# Patient Record
Sex: FEMALE | Race: WHITE | URBAN - METROPOLITAN AREA
[De-identification: names, ages, dates, MRNs, and addresses within clinical notes are randomized per-mention and may not be internally consistent; named-entity substitution may affect disease eponyms.]

---

## 2018-11-29 ENCOUNTER — APPOINTMENT (RX ONLY)
Dept: URBAN - METROPOLITAN AREA CLINIC 16 | Facility: CLINIC | Age: 73
Setting detail: DERMATOLOGY
End: 2018-11-29

## 2018-11-29 DIAGNOSIS — B00.1 HERPESVIRAL VESICULAR DERMATITIS: ICD-10-CM

## 2018-11-29 DIAGNOSIS — Z41.9 ENCOUNTER FOR PROCEDURE FOR PURPOSES OTHER THAN REMEDYING HEALTH STATE, UNSPECIFIED: ICD-10-CM

## 2018-11-29 PROBLEM — H91.90 UNSPECIFIED HEARING LOSS, UNSPECIFIED EAR: Status: ACTIVE | Noted: 2018-11-29

## 2018-11-29 PROBLEM — Z85.828 PERSONAL HISTORY OF OTHER MALIGNANT NEOPLASM OF SKIN: Status: ACTIVE | Noted: 2018-11-29

## 2018-11-29 PROBLEM — M12.9 ARTHROPATHY, UNSPECIFIED: Status: ACTIVE | Noted: 2018-11-29

## 2018-11-29 PROCEDURE — ? PRESCRIPTION

## 2018-11-29 PROCEDURE — ? COUNSELING

## 2018-11-29 PROCEDURE — ? DIAGNOSIS COMMENT

## 2018-11-29 PROCEDURE — ? FILLERS

## 2018-11-29 RX ORDER — VALACYCLOVIR HYDROCHLORIDE 1 G/1
TABLET, FILM COATED ORAL Q12 HOURS
Qty: 16 | Refills: 0 | Status: ERX | COMMUNITY
Start: 2018-11-29

## 2018-11-29 RX ADMIN — VALACYCLOVIR HYDROCHLORIDE: 1 TABLET, FILM COATED ORAL at 19:54

## 2018-11-29 ASSESSMENT — LOCATION DETAILED DESCRIPTION DERM: LOCATION DETAILED: NASAL SUPRATIP

## 2018-11-29 ASSESSMENT — LOCATION SIMPLE DESCRIPTION DERM: LOCATION SIMPLE: NOSE

## 2018-11-29 ASSESSMENT — LOCATION ZONE DERM: LOCATION ZONE: NOSE

## 2018-11-29 NOTE — PROCEDURE: DIAGNOSIS COMMENT
Detail Level: Simple
Comment: Not flaring on exam today. Patient reports <6 breakouts per year on her nose. She has not been able to come to the office during active breakout to perform swab for HSV pcr but reports that her breakouts resolve with valacyclovir. Discussed that if breakouts occur more frequently then recommend starting valacyclovir 500mg daily as prophylaxis.

## 2018-11-29 NOTE — PROCEDURE: FILLERS
Filler Comments: Makeup removed and EMLA cream applied by MA. Areas cleansed with alcohol followed by chlorhexidine by MD. Patient confirmed no dental work in past 2 weeks. Areas again cleansed with chlorhexidine prior to injection. Patient tolerated well. Ice applied afterwards.

## 2018-12-03 ENCOUNTER — APPOINTMENT (RX ONLY)
Dept: URBAN - METROPOLITAN AREA CLINIC 16 | Facility: CLINIC | Age: 73
Setting detail: DERMATOLOGY
End: 2018-12-03

## 2018-12-03 DIAGNOSIS — Z41.9 ENCOUNTER FOR PROCEDURE FOR PURPOSES OTHER THAN REMEDYING HEALTH STATE, UNSPECIFIED: ICD-10-CM

## 2018-12-03 PROCEDURE — ? DIAGNOSIS COMMENT

## 2018-12-03 ASSESSMENT — LOCATION SIMPLE DESCRIPTION DERM: LOCATION SIMPLE: LEFT CHEEK

## 2018-12-03 ASSESSMENT — LOCATION DETAILED DESCRIPTION DERM: LOCATION DETAILED: LEFT SUPERIOR LATERAL MALAR CHEEK

## 2018-12-03 ASSESSMENT — LOCATION ZONE DERM: LOCATION ZONE: FACE

## 2018-12-03 NOTE — PROCEDURE: DIAGNOSIS COMMENT
Detail Level: Simple
Comment: 1 syringe of Voluma placed along bilateral cheeks at last visit, 11/29/18. Patient does have minimal swelling along left malar cheek today (see photo). Favor that this may be secondary to bruising from procedure, also volume may be more accentuated in this area now post-filler. Discussed with patient that she likely needs a second syringe of Voluma medially. Recommend waiting until 1 month post-treatment before placing additional filler. If patient does not want additional Voluma and mild swelling remains, could consider hyaluronidase focally in that area to try to dissolve the filler. Patient agreeable to plan.

## 2018-12-07 ENCOUNTER — APPOINTMENT (RX ONLY)
Dept: URBAN - METROPOLITAN AREA CLINIC 16 | Facility: CLINIC | Age: 73
Setting detail: DERMATOLOGY
End: 2018-12-07

## 2018-12-07 DIAGNOSIS — B00.1 HERPESVIRAL VESICULAR DERMATITIS: ICD-10-CM

## 2018-12-07 PROCEDURE — ? PRESCRIPTION

## 2018-12-07 RX ORDER — VALACYCLOVIR 500 MG/1
TABLET ORAL
Qty: 30 | Refills: 3 | Status: ERX | COMMUNITY
Start: 2018-12-07

## 2018-12-07 RX ADMIN — VALACYCLOVIR: 500 TABLET ORAL at 17:24

## 2019-01-09 ENCOUNTER — APPOINTMENT (RX ONLY)
Dept: URBAN - METROPOLITAN AREA CLINIC 16 | Facility: CLINIC | Age: 74
Setting detail: DERMATOLOGY
End: 2019-01-09

## 2019-01-09 DIAGNOSIS — L71.8 OTHER ROSACEA: ICD-10-CM

## 2019-01-09 DIAGNOSIS — Z41.9 ENCOUNTER FOR PROCEDURE FOR PURPOSES OTHER THAN REMEDYING HEALTH STATE, UNSPECIFIED: ICD-10-CM

## 2019-01-09 DIAGNOSIS — B00.1 HERPESVIRAL VESICULAR DERMATITIS: ICD-10-CM

## 2019-01-09 DIAGNOSIS — Z85.828 PERSONAL HISTORY OF OTHER MALIGNANT NEOPLASM OF SKIN: ICD-10-CM

## 2019-01-09 PROCEDURE — ? PRESCRIPTION

## 2019-01-09 PROCEDURE — ? TREATMENT REGIMEN

## 2019-01-09 PROCEDURE — ? BOTOX (U OR CC)

## 2019-01-09 PROCEDURE — ? DIAGNOSIS COMMENT

## 2019-01-09 PROCEDURE — ? COUNSELING

## 2019-01-09 PROCEDURE — 99213 OFFICE O/P EST LOW 20 MIN: CPT

## 2019-01-09 RX ORDER — VALACYCLOVIR HYDROCHLORIDE 1 G/1
TABLET, FILM COATED ORAL Q12 HOURS
Qty: 16 | Refills: 0 | Status: ERX

## 2019-01-09 ASSESSMENT — LOCATION DETAILED DESCRIPTION DERM
LOCATION DETAILED: LEFT MEDIAL EYEBROW
LOCATION DETAILED: GLABELLA
LOCATION DETAILED: NASAL SUPRATIP
LOCATION DETAILED: RIGHT MEDIAL EYEBROW
LOCATION DETAILED: NASAL TIP
LOCATION DETAILED: LEFT CENTRAL EYEBROW
LOCATION DETAILED: RIGHT CENTRAL EYEBROW

## 2019-01-09 ASSESSMENT — LOCATION SIMPLE DESCRIPTION DERM
LOCATION SIMPLE: LEFT EYEBROW
LOCATION SIMPLE: GLABELLA
LOCATION SIMPLE: RIGHT EYEBROW
LOCATION SIMPLE: NOSE

## 2019-01-09 ASSESSMENT — LOCATION ZONE DERM
LOCATION ZONE: NOSE
LOCATION ZONE: FACE

## 2019-01-09 NOTE — PROCEDURE: DIAGNOSIS COMMENT
Comment: Not flaring on exam today, diagnosis not confirmed by viral culture or PCR or clinical exam. Patient reports better control with intermittent use of Valtrex as needed for flares as opposed to daily Valtrex at 500mg. She has not been able to come to the office during active breakout to perform swab for HSV pcr but reports that her breakouts resolve with valacyclovir.
Detail Level: Simple
Comment: Last Botox 5/2018. \\n\\nDiscussed expectations for results from  detail today. Patient had 1 prior syringe of Voluma in cheeks and overall has improvement compared to prior photos but wants to see more results - discussed realistic expectations in terms of results, recommend additional syringe(s) of Voluma vs face lift if she wants a more dramatic lift.
Comment: Mild papulopustular rosacea with few telangiectasias. Continue Oracea 40mg daily. Restart triple cream - azelaic acid/metronidazole/ivermectin daily.
Comment: History of BCC, left shoulder 2007. Last TBSE 8/2018, recommend yearly TBSE

## 2019-01-09 NOTE — PROCEDURE: TREATMENT REGIMEN
Action 4: Continue
Continue Regimen: Continue SPF at least 30 daily. \\nContinue Oracea 40mg daily.
Detail Level: Zone
Start Regimen: Restart triple cream - azelaic acid/metronidazole/ivermectin daily.

## 2022-07-07 RX ORDER — MEDROXYPROGESTERONE ACETATE 5 MG/1
TABLET ORAL
COMMUNITY

## 2022-07-07 RX ORDER — CELECOXIB 100 MG/1
1 CAPSULE ORAL
COMMUNITY

## 2022-07-07 RX ORDER — ONDANSETRON 4 MG/1
TABLET, FILM COATED ORAL
COMMUNITY

## 2022-07-07 RX ORDER — PREDNISONE 10 MG/1
TABLET ORAL
COMMUNITY